# Patient Record
Sex: MALE | Race: WHITE | NOT HISPANIC OR LATINO | Employment: OTHER | URBAN - METROPOLITAN AREA
[De-identification: names, ages, dates, MRNs, and addresses within clinical notes are randomized per-mention and may not be internally consistent; named-entity substitution may affect disease eponyms.]

---

## 2020-02-17 ENCOUNTER — OFFICE VISIT (OUTPATIENT)
Dept: FAMILY MEDICINE | Facility: CLINIC | Age: 50
End: 2020-02-17
Payer: COMMERCIAL

## 2020-02-17 VITALS
BODY MASS INDEX: 33.94 KG/M2 | OXYGEN SATURATION: 96 % | TEMPERATURE: 100 F | DIASTOLIC BLOOD PRESSURE: 98 MMHG | HEART RATE: 111 BPM | HEIGHT: 74 IN | WEIGHT: 264.44 LBS | SYSTOLIC BLOOD PRESSURE: 124 MMHG

## 2020-02-17 DIAGNOSIS — J22 LOWER RESPIRATORY INFECTION: Primary | ICD-10-CM

## 2020-02-17 DIAGNOSIS — R68.89 FLU-LIKE SYMPTOMS: ICD-10-CM

## 2020-02-17 LAB
CTP QC/QA: YES
FLUAV AG NPH QL: NEGATIVE
FLUBV AG NPH QL: NEGATIVE

## 2020-02-17 PROCEDURE — 3008F PR BODY MASS INDEX (BMI) DOCUMENTED: ICD-10-PCS | Mod: CPTII,S$GLB,, | Performed by: FAMILY MEDICINE

## 2020-02-17 PROCEDURE — 87804 INFLUENZA ASSAY W/OPTIC: CPT | Mod: QW,S$GLB,, | Performed by: FAMILY MEDICINE

## 2020-02-17 PROCEDURE — 99203 OFFICE O/P NEW LOW 30 MIN: CPT | Mod: 25,S$GLB,, | Performed by: FAMILY MEDICINE

## 2020-02-17 PROCEDURE — 99203 PR OFFICE/OUTPT VISIT, NEW, LEVL III, 30-44 MIN: ICD-10-PCS | Mod: 25,S$GLB,, | Performed by: FAMILY MEDICINE

## 2020-02-17 PROCEDURE — 3008F BODY MASS INDEX DOCD: CPT | Mod: CPTII,S$GLB,, | Performed by: FAMILY MEDICINE

## 2020-02-17 PROCEDURE — 99999 PR PBB SHADOW E&M-NEW PATIENT-LVL III: ICD-10-PCS | Mod: PBBFAC,,, | Performed by: FAMILY MEDICINE

## 2020-02-17 PROCEDURE — 87804 POCT INFLUENZA A/B: ICD-10-PCS | Mod: QW,S$GLB,, | Performed by: FAMILY MEDICINE

## 2020-02-17 PROCEDURE — 99999 PR PBB SHADOW E&M-NEW PATIENT-LVL III: CPT | Mod: PBBFAC,,, | Performed by: FAMILY MEDICINE

## 2020-02-17 RX ORDER — AZITHROMYCIN 250 MG/1
TABLET, FILM COATED ORAL
Qty: 6 TABLET | Refills: 0 | Status: SHIPPED | OUTPATIENT
Start: 2020-02-17 | End: 2020-02-22

## 2020-02-17 RX ORDER — BENZONATATE 200 MG/1
200 CAPSULE ORAL 3 TIMES DAILY PRN
Qty: 30 CAPSULE | Refills: 2 | Status: SHIPPED | OUTPATIENT
Start: 2020-02-17 | End: 2020-02-27

## 2020-02-17 NOTE — PROGRESS NOTES
THIS DOCUMENT WAS MADE IN PART WITH VOICE RECOGNITION SOFTWARE.  OCCASIONALLY THIS SOFTWARE WILL MISINTERPRET WORDS OR PHRASES.    Assessment and Plan:    1. Flu-like symptoms  Influenza test negative  - POCT Influenza A/B    2. Lower respiratory infection  - azithromycin (Z-LELAND) 250 MG tablet; Take 2 tablets by mouth on day 1; Take 1 tablet by mouth on days 2-5  Dispense: 6 tablet; Refill: 0  - benzonatate (TESSALON) 200 MG capsule; Take 1 capsule (200 mg total) by mouth 3 (three) times daily as needed.  Dispense: 30 capsule; Refill: 2        ______________________________________________________________________  Subjective:    Chief Complaint:  Chief Complaint   Patient presents with    Chest Congestion     ocurring since thursday, cough with brown phelgm production, chills, and body aches         HPI:  Atilio is a 49 y.o. year old     Flu like symptoms  Duration 3-4 days  Cough productive of brown phlegm  Associated with chills and body aches    Past Medical History:  Past Medical History:   Diagnosis Date    Atrial fibrillation     as a result of [place] spotted fever, resolved    [place] spotted fever 2006       Past Surgical History:  Past Surgical History:   Procedure Laterality Date    ATRIAL ABLATION SURGERY      COLONOSCOPY N/A 9/17/2015    Procedure: COLONOSCOPY;  Surgeon: Garcia Suero MD;  Location: [facility];  Service: Endoscopy;  Laterality: N/A;    CYST REMOVAL      arms    TONSILLECTOMY         Family History:  Family History   Problem Relation Age of Onset    Arthritis Mother     Pulmonary embolism Father        Social History:  Social History     Socioeconomic History    Marital status:      Spouse name: Not on file    Number of children: Not on file    Years of education: Not on file    Highest education level: Not on file   Occupational History    Not on file   Social Needs    Financial resource strain: Not on file    Food insecurity:     Worry: Not  "on file     Inability: Not on file    Transportation needs:     Medical: Not on file     Non-medical: Not on file   Tobacco Use    Smoking status: Never Smoker   Substance and Sexual Activity    Alcohol use: Yes     Alcohol/week: 1.0 standard drinks     Types: 1 Cans of beer per week     Comment: 3 glasses wine per month    Drug use: No    Sexual activity: Not on file   Lifestyle    Physical activity:     Days per week: Not on file     Minutes per session: Not on file    Stress: Not on file   Relationships    Social connections:     Talks on phone: Not on file     Gets together: Not on file     Attends Cheondoism service: Not on file     Active member of club or organization: Not on file     Attends meetings of clubs or organizations: Not on file     Relationship status: Not on file   Other Topics Concern    Not on file   Social History Narrative    Not on file       Medications:  Current Outpatient Medications on File Prior to Visit   Medication Sig Dispense Refill    aspirin (ECOTRIN) 81 MG EC tablet Take 81 mg by mouth once daily.      multivitamin capsule Take 1 capsule by mouth once daily.       No current facility-administered medications on file prior to visit.        Allergies:  Hydrocortisone (bulk)    Immunizations:    There is no immunization history on file for this patient.    Review of Systems:  Review of Systems   Constitutional: Positive for chills and fatigue.   Respiratory: Positive for cough.    Musculoskeletal: Positive for myalgias.   All other systems reviewed and are negative.      Objective:    Vitals:  Vitals:    02/17/20 0852   BP: (!) 124/98   Pulse: (!) 111   Temp: 99.6 °F (37.6 °C)   TempSrc: Oral   SpO2: 96%   Weight: 120 kg (264 lb 7.1 oz)   Height: 6' 2" (1.88 m)   PainSc:   6   PainLoc: Generalized       Physical Exam   Constitutional: He appears well-developed.   HENT:   Head: Normocephalic.   Nose: Mucosal edema and rhinorrhea present.   Mouth/Throat: Posterior " oropharyngeal erythema present. No oropharyngeal exudate.   Eyes: EOM are normal.   Neck: Normal range of motion. Neck supple.   Cardiovascular: Normal rate and regular rhythm.   Pulmonary/Chest: Effort normal and breath sounds normal.   Abdominal: Soft.   Skin: Skin is warm. No rash noted.       Data:  No previous labs, imaging, or notes available.        Charanjit Mansfield MD  [facility]

## 2020-12-24 ENCOUNTER — OFFICE VISIT (OUTPATIENT)
Dept: URGENT CARE | Facility: CLINIC | Age: 50
End: 2020-12-24
Payer: COMMERCIAL

## 2020-12-24 ENCOUNTER — TELEPHONE (OUTPATIENT)
Dept: FAMILY MEDICINE | Facility: CLINIC | Age: 50
End: 2020-12-24

## 2020-12-24 VITALS
WEIGHT: 240 LBS | DIASTOLIC BLOOD PRESSURE: 91 MMHG | BODY MASS INDEX: 30.8 KG/M2 | HEIGHT: 74 IN | TEMPERATURE: 98 F | SYSTOLIC BLOOD PRESSURE: 163 MMHG | OXYGEN SATURATION: 99 % | RESPIRATION RATE: 16 BRPM | HEART RATE: 82 BPM

## 2020-12-24 DIAGNOSIS — R31.9 HEMATURIA, UNSPECIFIED TYPE: ICD-10-CM

## 2020-12-24 DIAGNOSIS — R21 RASH: Primary | ICD-10-CM

## 2020-12-24 DIAGNOSIS — R30.0 DYSURIA: ICD-10-CM

## 2020-12-24 LAB
BILIRUB UR QL STRIP: NEGATIVE
GLUCOSE UR QL STRIP: NEGATIVE
KETONES UR QL STRIP: NEGATIVE
LEUKOCYTE ESTERASE UR QL STRIP: NEGATIVE
PH, POC UA: 5 (ref 5–8)
POC BLOOD, URINE: POSITIVE
POC NITRATES, URINE: NEGATIVE
PROT UR QL STRIP: NEGATIVE
SP GR UR STRIP: 1.02 (ref 1–1.03)
UROBILINOGEN UR STRIP-ACNC: NORMAL (ref 0.3–2.2)

## 2020-12-24 PROCEDURE — 81003 POCT URINALYSIS, DIPSTICK, AUTOMATED, W/O SCOPE: ICD-10-PCS | Mod: QW,S$GLB,, | Performed by: PHYSICIAN ASSISTANT

## 2020-12-24 PROCEDURE — 3008F PR BODY MASS INDEX (BMI) DOCUMENTED: ICD-10-PCS | Mod: CPTII,S$GLB,, | Performed by: PHYSICIAN ASSISTANT

## 2020-12-24 PROCEDURE — 99214 PR OFFICE/OUTPT VISIT, EST, LEVL IV, 30-39 MIN: ICD-10-PCS | Mod: 25,S$GLB,, | Performed by: PHYSICIAN ASSISTANT

## 2020-12-24 PROCEDURE — 99214 OFFICE O/P EST MOD 30 MIN: CPT | Mod: 25,S$GLB,, | Performed by: PHYSICIAN ASSISTANT

## 2020-12-24 PROCEDURE — 81003 URINALYSIS AUTO W/O SCOPE: CPT | Mod: QW,S$GLB,, | Performed by: PHYSICIAN ASSISTANT

## 2020-12-24 PROCEDURE — 3008F BODY MASS INDEX DOCD: CPT | Mod: CPTII,S$GLB,, | Performed by: PHYSICIAN ASSISTANT

## 2020-12-24 PROCEDURE — 96372 PR INJECTION,THERAP/PROPH/DIAG2ST, IM OR SUBCUT: ICD-10-PCS | Mod: S$GLB,,, | Performed by: FAMILY MEDICINE

## 2020-12-24 PROCEDURE — 96372 THER/PROPH/DIAG INJ SC/IM: CPT | Mod: S$GLB,,, | Performed by: FAMILY MEDICINE

## 2020-12-24 RX ORDER — BETAMETHASONE SODIUM PHOSPHATE AND BETAMETHASONE ACETATE 3; 3 MG/ML; MG/ML
9 INJECTION, SUSPENSION INTRA-ARTICULAR; INTRALESIONAL; INTRAMUSCULAR; SOFT TISSUE
Status: COMPLETED | OUTPATIENT
Start: 2020-12-24 | End: 2020-12-24

## 2020-12-24 RX ADMIN — BETAMETHASONE SODIUM PHOSPHATE AND BETAMETHASONE ACETATE 9 MG: 3; 3 INJECTION, SUSPENSION INTRA-ARTICULAR; INTRALESIONAL; INTRAMUSCULAR; SOFT TISSUE at 01:12

## 2020-12-24 NOTE — TELEPHONE ENCOUNTER
----- Message from Humble Willis sent at 12/24/2020 10:38 AM CST -----  Type:  Same Day Appointment Request    Caller is requesting a same day appointment.  Caller declined first available appointment listed below.      Name of Caller:  Patient  When is the first available appointment?  12/28  Symptoms:  Rash spreading  Best Call Back Number: [phone]  Additional Information:

## 2020-12-24 NOTE — TELEPHONE ENCOUNTER
Complains of working in yard and may have gotten into some ants or something else.    Asking for antibiotics; advised we do not have any appts today and recommended Urgent Care

## 2020-12-31 LAB
BACTERIA UR CULT: NO GROWTH
BACTERIA UR CULT: NORMAL

## 2021-01-05 ENCOUNTER — TELEPHONE (OUTPATIENT)
Dept: URGENT CARE | Facility: CLINIC | Age: 51
End: 2021-01-05

## 2021-04-29 ENCOUNTER — PATIENT MESSAGE (OUTPATIENT)
Dept: RESEARCH | Facility: HOSPITAL | Age: 51
End: 2021-04-29

## 2021-06-25 ENCOUNTER — IMMUNIZATION (OUTPATIENT)
Dept: FAMILY MEDICINE | Facility: CLINIC | Age: 51
End: 2021-06-25
Payer: COMMERCIAL

## 2021-06-25 DIAGNOSIS — Z23 NEED FOR VACCINATION: Primary | ICD-10-CM

## 2021-06-25 PROCEDURE — 0031A COVID-19,VECTOR-NR,RS-AD26,PF,0.5 ML DOSE VACCINE (JANSSEN): CPT | Mod: PBBFAC | Performed by: FAMILY MEDICINE

## 2021-11-12 ENCOUNTER — OFFICE VISIT (OUTPATIENT)
Dept: FAMILY MEDICINE | Facility: CLINIC | Age: 51
End: 2021-11-12
Payer: COMMERCIAL

## 2021-11-12 VITALS
WEIGHT: 255.5 LBS | TEMPERATURE: 99 F | DIASTOLIC BLOOD PRESSURE: 88 MMHG | HEART RATE: 87 BPM | BODY MASS INDEX: 32.81 KG/M2 | SYSTOLIC BLOOD PRESSURE: 136 MMHG | OXYGEN SATURATION: 98 %

## 2021-11-12 DIAGNOSIS — R05.9 COUGH: Primary | ICD-10-CM

## 2021-11-12 PROCEDURE — 99999 PR PBB SHADOW E&M-EST. PATIENT-LVL III: CPT | Mod: PBBFAC,,, | Performed by: INTERNAL MEDICINE

## 2021-11-12 PROCEDURE — 1160F PR REVIEW ALL MEDS BY PRESCRIBER/CLIN PHARMACIST DOCUMENTED: ICD-10-PCS | Mod: CPTII,S$GLB,, | Performed by: INTERNAL MEDICINE

## 2021-11-12 PROCEDURE — 90686 IIV4 VACC NO PRSV 0.5 ML IM: CPT | Mod: S$GLB,,, | Performed by: INTERNAL MEDICINE

## 2021-11-12 PROCEDURE — 90471 FLU VACCINE (QUAD) GREATER THAN OR EQUAL TO 3YO PRESERVATIVE FREE IM: ICD-10-PCS | Mod: S$GLB,,, | Performed by: INTERNAL MEDICINE

## 2021-11-12 PROCEDURE — 99999 PR PBB SHADOW E&M-EST. PATIENT-LVL III: ICD-10-PCS | Mod: PBBFAC,,, | Performed by: INTERNAL MEDICINE

## 2021-11-12 PROCEDURE — 3079F DIAST BP 80-89 MM HG: CPT | Mod: CPTII,S$GLB,, | Performed by: INTERNAL MEDICINE

## 2021-11-12 PROCEDURE — 90471 IMMUNIZATION ADMIN: CPT | Mod: S$GLB,,, | Performed by: INTERNAL MEDICINE

## 2021-11-12 PROCEDURE — 90686 FLU VACCINE (QUAD) GREATER THAN OR EQUAL TO 3YO PRESERVATIVE FREE IM: ICD-10-PCS | Mod: S$GLB,,, | Performed by: INTERNAL MEDICINE

## 2021-11-12 PROCEDURE — 99213 PR OFFICE/OUTPT VISIT, EST, LEVL III, 20-29 MIN: ICD-10-PCS | Mod: 25,S$GLB,, | Performed by: INTERNAL MEDICINE

## 2021-11-12 PROCEDURE — 3008F BODY MASS INDEX DOCD: CPT | Mod: CPTII,S$GLB,, | Performed by: INTERNAL MEDICINE

## 2021-11-12 PROCEDURE — 3075F SYST BP GE 130 - 139MM HG: CPT | Mod: CPTII,S$GLB,, | Performed by: INTERNAL MEDICINE

## 2021-11-12 PROCEDURE — 3079F PR MOST RECENT DIASTOLIC BLOOD PRESSURE 80-89 MM HG: ICD-10-PCS | Mod: CPTII,S$GLB,, | Performed by: INTERNAL MEDICINE

## 2021-11-12 PROCEDURE — 1159F PR MEDICATION LIST DOCUMENTED IN MEDICAL RECORD: ICD-10-PCS | Mod: CPTII,S$GLB,, | Performed by: INTERNAL MEDICINE

## 2021-11-12 PROCEDURE — 3075F PR MOST RECENT SYSTOLIC BLOOD PRESS GE 130-139MM HG: ICD-10-PCS | Mod: CPTII,S$GLB,, | Performed by: INTERNAL MEDICINE

## 2021-11-12 PROCEDURE — 1160F RVW MEDS BY RX/DR IN RCRD: CPT | Mod: CPTII,S$GLB,, | Performed by: INTERNAL MEDICINE

## 2021-11-12 PROCEDURE — 99213 OFFICE O/P EST LOW 20 MIN: CPT | Mod: 25,S$GLB,, | Performed by: INTERNAL MEDICINE

## 2021-11-12 PROCEDURE — 3008F PR BODY MASS INDEX (BMI) DOCUMENTED: ICD-10-PCS | Mod: CPTII,S$GLB,, | Performed by: INTERNAL MEDICINE

## 2021-11-12 PROCEDURE — 1159F MED LIST DOCD IN RCRD: CPT | Mod: CPTII,S$GLB,, | Performed by: INTERNAL MEDICINE

## 2021-11-15 ENCOUNTER — TELEPHONE (OUTPATIENT)
Dept: FAMILY MEDICINE | Facility: CLINIC | Age: 51
End: 2021-11-15
Payer: COMMERCIAL

## 2021-11-15 DIAGNOSIS — U07.1 COVID-19: Primary | ICD-10-CM

## 2021-11-26 ENCOUNTER — LAB VISIT (OUTPATIENT)
Dept: LAB | Facility: HOSPITAL | Age: 51
End: 2021-11-26
Attending: PHYSICIAN ASSISTANT
Payer: COMMERCIAL

## 2021-11-26 ENCOUNTER — OFFICE VISIT (OUTPATIENT)
Dept: FAMILY MEDICINE | Facility: CLINIC | Age: 51
End: 2021-11-26
Payer: COMMERCIAL

## 2021-11-26 VITALS
OXYGEN SATURATION: 98 % | DIASTOLIC BLOOD PRESSURE: 100 MMHG | BODY MASS INDEX: 33.26 KG/M2 | WEIGHT: 259.06 LBS | SYSTOLIC BLOOD PRESSURE: 160 MMHG | HEART RATE: 94 BPM

## 2021-11-26 DIAGNOSIS — Z86.16 HISTORY OF COVID-19: ICD-10-CM

## 2021-11-26 DIAGNOSIS — R03.0 ELEVATED BP WITHOUT DIAGNOSIS OF HYPERTENSION: ICD-10-CM

## 2021-11-26 DIAGNOSIS — Z86.16 HISTORY OF COVID-19: Primary | ICD-10-CM

## 2021-11-26 LAB
ALBUMIN SERPL BCP-MCNC: 4.1 G/DL (ref 3.5–5.2)
ALP SERPL-CCNC: 79 U/L (ref 55–135)
ALT SERPL W/O P-5'-P-CCNC: 36 U/L (ref 10–44)
ANION GAP SERPL CALC-SCNC: 7 MMOL/L (ref 8–16)
AST SERPL-CCNC: 25 U/L (ref 10–40)
BASOPHILS # BLD AUTO: 0.09 K/UL (ref 0–0.2)
BASOPHILS NFR BLD: 1.4 % (ref 0–1.9)
BILIRUB SERPL-MCNC: 0.4 MG/DL (ref 0.1–1)
BUN SERPL-MCNC: 21 MG/DL (ref 6–20)
CALCIUM SERPL-MCNC: 9.7 MG/DL (ref 8.7–10.5)
CHLORIDE SERPL-SCNC: 101 MMOL/L (ref 95–110)
CO2 SERPL-SCNC: 29 MMOL/L (ref 23–29)
CREAT SERPL-MCNC: 1.1 MG/DL (ref 0.5–1.4)
DIFFERENTIAL METHOD: ABNORMAL
EOSINOPHIL # BLD AUTO: 0.2 K/UL (ref 0–0.5)
EOSINOPHIL NFR BLD: 2.7 % (ref 0–8)
ERYTHROCYTE [DISTWIDTH] IN BLOOD BY AUTOMATED COUNT: 11.9 % (ref 11.5–14.5)
EST. GFR  (AFRICAN AMERICAN): >60 ML/MIN/1.73 M^2
EST. GFR  (NON AFRICAN AMERICAN): >60 ML/MIN/1.73 M^2
GLUCOSE SERPL-MCNC: 108 MG/DL (ref 70–110)
HCT VFR BLD AUTO: 45.8 % (ref 40–54)
HGB BLD-MCNC: 15 G/DL (ref 14–18)
IMM GRANULOCYTES # BLD AUTO: 0.1 K/UL (ref 0–0.04)
IMM GRANULOCYTES NFR BLD AUTO: 1.5 % (ref 0–0.5)
LYMPHOCYTES # BLD AUTO: 1.7 K/UL (ref 1–4.8)
LYMPHOCYTES NFR BLD: 25.7 % (ref 18–48)
MCH RBC QN AUTO: 31.8 PG (ref 27–31)
MCHC RBC AUTO-ENTMCNC: 32.8 G/DL (ref 32–36)
MCV RBC AUTO: 97 FL (ref 82–98)
MONOCYTES # BLD AUTO: 0.7 K/UL (ref 0.3–1)
MONOCYTES NFR BLD: 10.7 % (ref 4–15)
NEUTROPHILS # BLD AUTO: 3.9 K/UL (ref 1.8–7.7)
NEUTROPHILS NFR BLD: 58 % (ref 38–73)
NRBC BLD-RTO: 0 /100 WBC
PLATELET # BLD AUTO: 385 K/UL (ref 150–450)
PMV BLD AUTO: 9.6 FL (ref 9.2–12.9)
POTASSIUM SERPL-SCNC: 5 MMOL/L (ref 3.5–5.1)
PROT SERPL-MCNC: 7.6 G/DL (ref 6–8.4)
RBC # BLD AUTO: 4.72 M/UL (ref 4.6–6.2)
SODIUM SERPL-SCNC: 137 MMOL/L (ref 136–145)
WBC # BLD AUTO: 6.65 K/UL (ref 3.9–12.7)

## 2021-11-26 PROCEDURE — 80053 COMPREHEN METABOLIC PANEL: CPT | Performed by: PHYSICIAN ASSISTANT

## 2021-11-26 PROCEDURE — 99999 PR PBB SHADOW E&M-EST. PATIENT-LVL III: ICD-10-PCS | Mod: PBBFAC,,, | Performed by: PHYSICIAN ASSISTANT

## 2021-11-26 PROCEDURE — 99214 PR OFFICE/OUTPT VISIT, EST, LEVL IV, 30-39 MIN: ICD-10-PCS | Mod: S$GLB,,, | Performed by: PHYSICIAN ASSISTANT

## 2021-11-26 PROCEDURE — 99999 PR PBB SHADOW E&M-EST. PATIENT-LVL III: CPT | Mod: PBBFAC,,, | Performed by: PHYSICIAN ASSISTANT

## 2021-11-26 PROCEDURE — 36415 COLL VENOUS BLD VENIPUNCTURE: CPT | Mod: PO | Performed by: PHYSICIAN ASSISTANT

## 2021-11-26 PROCEDURE — 99214 OFFICE O/P EST MOD 30 MIN: CPT | Mod: S$GLB,,, | Performed by: PHYSICIAN ASSISTANT

## 2021-11-26 PROCEDURE — 85025 COMPLETE CBC W/AUTO DIFF WBC: CPT | Performed by: PHYSICIAN ASSISTANT

## 2021-11-29 ENCOUNTER — TELEPHONE (OUTPATIENT)
Dept: FAMILY MEDICINE | Facility: CLINIC | Age: 51
End: 2021-11-29
Payer: COMMERCIAL

## 2021-11-29 DIAGNOSIS — Z86.16 HISTORY OF COVID-19: Primary | ICD-10-CM

## 2021-11-29 RX ORDER — METHYLPREDNISOLONE 4 MG/1
TABLET ORAL
Qty: 1 EACH | Refills: 0 | Status: SHIPPED | OUTPATIENT
Start: 2021-11-29 | End: 2023-01-03 | Stop reason: ALTCHOICE

## 2022-05-09 ENCOUNTER — PATIENT MESSAGE (OUTPATIENT)
Dept: SMOKING CESSATION | Facility: CLINIC | Age: 52
End: 2022-05-09
Payer: COMMERCIAL

## 2024-08-19 ENCOUNTER — OFFICE VISIT (OUTPATIENT)
Dept: RHEUMATOLOGY | Facility: CLINIC | Age: 54
End: 2024-08-19
Payer: COMMERCIAL

## 2024-08-19 VITALS
DIASTOLIC BLOOD PRESSURE: 78 MMHG | HEART RATE: 86 BPM | WEIGHT: 255.5 LBS | SYSTOLIC BLOOD PRESSURE: 124 MMHG | HEIGHT: 74 IN | BODY MASS INDEX: 32.79 KG/M2

## 2024-08-19 DIAGNOSIS — M13.80 OLIGOARTHRITIS: Primary | ICD-10-CM

## 2024-08-19 DIAGNOSIS — Z71.89 COUNSELING AND COORDINATION OF CARE: ICD-10-CM

## 2024-08-19 PROCEDURE — 1159F MED LIST DOCD IN RCRD: CPT | Mod: CPTII,S$GLB,, | Performed by: STUDENT IN AN ORGANIZED HEALTH CARE EDUCATION/TRAINING PROGRAM

## 2024-08-19 PROCEDURE — 99204 OFFICE O/P NEW MOD 45 MIN: CPT | Mod: S$GLB,,, | Performed by: STUDENT IN AN ORGANIZED HEALTH CARE EDUCATION/TRAINING PROGRAM

## 2024-08-19 PROCEDURE — 3008F BODY MASS INDEX DOCD: CPT | Mod: CPTII,S$GLB,, | Performed by: STUDENT IN AN ORGANIZED HEALTH CARE EDUCATION/TRAINING PROGRAM

## 2024-08-19 PROCEDURE — 3078F DIAST BP <80 MM HG: CPT | Mod: CPTII,S$GLB,, | Performed by: STUDENT IN AN ORGANIZED HEALTH CARE EDUCATION/TRAINING PROGRAM

## 2024-08-19 PROCEDURE — 3074F SYST BP LT 130 MM HG: CPT | Mod: CPTII,S$GLB,, | Performed by: STUDENT IN AN ORGANIZED HEALTH CARE EDUCATION/TRAINING PROGRAM

## 2024-08-19 PROCEDURE — 99999 PR PBB SHADOW E&M-EST. PATIENT-LVL III: CPT | Mod: PBBFAC,,, | Performed by: STUDENT IN AN ORGANIZED HEALTH CARE EDUCATION/TRAINING PROGRAM

## 2024-08-19 RX ORDER — COLCHICINE 0.6 MG/1
TABLET ORAL
Qty: 9 TABLET | Refills: 0 | Status: SHIPPED | OUTPATIENT
Start: 2024-08-19

## 2024-08-19 RX ORDER — ASPIRIN 81 MG/1
TABLET ORAL
COMMUNITY

## 2024-08-19 RX ORDER — MELOXICAM 15 MG/1
15 TABLET ORAL
COMMUNITY
Start: 2024-08-05

## 2024-08-19 NOTE — PROGRESS NOTES
8/19/2024    12:49 PM   Rapid3 Question Responses and Scores   MDHAQ Score 0.1   Psychologic Score 0   Pain Score 4   When you awakened in the morning OVER THE LAST WEEK, did you feel stiff? No   Fatigue Score 0   Global Health Score 0   RAPID3 Score 1.44        Answers submitted by the patient for this visit:  Rheumatology Questionnaire (Submitted on 8/19/2024)  fever: No  eye redness: No  mouth sores: No  headaches: No  shortness of breath: No  chest pain: No  trouble swallowing: No  diarrhea: No  constipation: No  unexpected weight change: No  genital sore: No  During the last 3 days, have you had a skin rash?: No  Bruises or bleeds easily: No  cough: No

## 2024-08-19 NOTE — PATIENT INSTRUCTIONS
Include these anti-Inflammatory foods:  ? Fresh vegetables (all kinds): Aim for a variety of types and colours (a rainbow  of veggies provides phytonutrients) - with a minimum of four to five servings  per day - especially dark, leafy greens (spinach, kale, Asian greens) broccoli  and cauliflower, Brussel sprouts, beetroot  ? Whole pieces of fruit (not juice that strips them of fibre): Three to four  servings per day is a good amount for most people, especially berries and  cherries  ? Fatty fish - such as salmon, trout, sardines, mackerel & tuna  ? Herbs & spices: turmeric, martín, basil, oregano, thyme, cinnamon etc.,  ? Healthy fats: extra virgin olive oil is the best option, avocado oil and the fats  found in seeds, nuts, fish  ? Nuts/seeds - such as walnuts, cashews, almonds, pistachios, pine nuts, demetrius,  hemp  ? Whole grains - brown rice, amaranth, buckwheat, and quinoa  ? Legumes/beans: especially black beans, black-eyed peas, chickpeas, lentils,  red kidney  ? Drinks: water, green tea and organic coffee in moderation  Avoid these inflammatory / processed foods  ? Processed meats - sausages and cold cut meats - ham, salami etc.  ? Refined sugars - found in soft drinks, cookies, cake, lollies, ice cream, some  breakfast cereals  ? Trans fats - found in deep fried foods, fast foods, commercially baked goods  ? Processed snack foods - such as chips and crackers  ? Gluten, white bread & pasta & too many carbohydrates  ? Soybean oil and vegetable oil  ? Alcohol in excessive quantities  www.arthritisnsw.org.au  Easy Food Swaps & Meal Ideas  Breakfast  Instead of white bread (crumpets and English muffins), butter, jam, Nutella, processed  breakfast cereals, pancakes and brown, try:  ? Avocado, nut spread (not peanut), banana/cinnamon on rye bread  ? Have porridge (whole oats) & berries- concha with a small amount of maple  syrup or honey  ? Organic scrambled / boiled eggs with herbs or turmeric  ? Avocado &  smoked salmon on whole grain bread  ? Granola with Greek yoghurt and berries  Lunch  Instead of hot chips, frozen meals, white bread sandwiches (cold cuts),  pasta/potato/Caesar salads try:  ? Vegetable frittata with salad  ? Salads with a variety of fruit and vegetables - also add brown rice, quinoa and  nuts to be more filling - dress with oil and vinegar and spices (not store-bought  dressings); replace lettuce with spinach  ? [place] cakes & salad  ? Lettuce wraps with salmon or trout, herbs and carrot/cucumber  Dinner  Instead of pizza, pasta, hamburgers, red meat roasts try:  ? Try one-tray bakes with fish, lean chicken and vegies (less washing up!)  ? Have minestrone soup instead of pumpkin soup - use a variety of vegies & herbs  - count how many colours and types you can get in one soup  ? Replace side serves of pasta, white rice and potatoes with more veggies, salad  or whole grains  ? Three Way fish and vegies on the BBQ and service with a salad  ? Asian soups with salmon and greens  Snacks  Instead of chips, biscuits, cakes, chocolate, try:  ? A handful of nuts - roast them or buy roasted for some extra crunch and flavour  ? Kale chips - kale, olive oil and a small sprinkle of salt & roast in the oven  ? Toasted kamran spread, carrots, cucumber with hummus or avocado dip  ? Mixed berries and Greek yoghurt  ? Grab a piece of fruit  Drinks  Instead of soft drinks and excessive alcohol, try:  ? Water with a squeeze of citrus or flavoured with cut up fruit  ? Mineral water  ? Green tea and organic coffee in moderation

## 2024-08-19 NOTE — PROGRESS NOTES
RHEUMATOLOGY OUTPATIENT CLINIC NOTE    8/19/2024    Attending Rheumatologist: Rajwinder Vazquez  Primary Care Provider: Charanjit Mansfield MD   Physician Requesting Consultation: Carlota Newell PA-C  [address]  [facility] [place] [address]  Chief Complaint/Reason For Consultation:  Swelling      Subjective:       HPI  Atilio Hernandes is a 54 y.o. White male with no medical history who comes for evaluation of joint pain.     He reports that in 2007 he has spotted [place] fever and since then he has developed episodes of left elbow and wrist pain that usually happens every 2-3 years without particular trigger. He reports that he was evaluated by hand surgery and rheumatologist Dr. Galdamez many years ago and RA was ruled out. He reports he had been treated with intraarticular injection that included steroids and other medications that he is not aware. Most recent flare was in 6/2024. He visited ED and left elbow was aspirated, no crystals and culture was negative. He then established care with another hand surgeon who did left elbow and left wrist CSI (only steroids), which helped and symptoms are resolved.     He denies any other joint pain, skin rashes, swollen glands, CP, SOB.   Eats healthy diet, avoids processed foods.   No smoking, vaping. Very minimal alcohol use. No illicit drugs.     Works as a .     Mother and brother with RA.     Labs   6/2024  CRP 2.6   ESR 53  CMP elevated LFTs, high total protein    Left elbow synovial fluid aspiration: no crystals. Negative culture    2007- had spotted [place] fever.   A couple of years later he developed left elbow and left wrist that would happened every 2 years ago. He reports he used to get left wrist   Now follows with hand surgery about 3 months ago and had left elbow and wrist CSI in 6/2024 which helped somewhat. He used to follow up with Dr. Blake. He reports he was never diagnosed with        Review of Systems   Constitutional:  Negative for fever and unexpected weight change.   HENT:  Negative for mouth sores and trouble swallowing.    Eyes:  Negative for redness.   Respiratory:  Negative for cough and shortness of breath.    Cardiovascular:  Negative for chest pain.   Gastrointestinal:  Negative for constipation and diarrhea.   Genitourinary:  Negative for genital sores.   Integumentary:  Negative for rash.   Neurological:  Negative for headaches.   Hematological:  Does not bruise/bleed easily.        Chronic comorbid conditions affecting medical decision making today:  Past Medical History:   Diagnosis Date    Allergy     Atrial fibrillation     as a result of [place] spotted fever, resolved    [place] spotted fever 01/01/2006     Past Surgical History:   Procedure Laterality Date    ATRIAL ABLATION SURGERY      COLONOSCOPY N/A 9/17/2015    Procedure: COLONOSCOPY;  Surgeon: Garcia Suero MD;  Location: [facility];  Service: Endoscopy;  Laterality: N/A;    CYST REMOVAL      arms    TONSILLECTOMY       Family History   Problem Relation Name Age of Onset    Arthritis Mother      Pulmonary embolism Father       Social History     Substance and Sexual Activity   Alcohol Use Yes    Alcohol/week: 1.0 standard drink of alcohol    Types: 1 Cans of beer per week    Comment: 3 glasses wine per month     Social History     Tobacco Use   Smoking Status Never   Smokeless Tobacco Never     Social History     Substance and Sexual Activity   Drug Use No       Current Outpatient Medications:     aspirin (ECOTRIN) 81 MG EC tablet, Take 1 tablet every day by oral route for 30 days., Disp: , Rfl:     meloxicam (MOBIC) 15 MG tablet, Take 15 mg by mouth. PRN, Disp: , Rfl:     multivit-min/ferrous fumarate (MULTI VITAMIN ORAL), 1 qd, Disp: , Rfl:     NAPROXEN ORAL, PRN, Disp: , Rfl:     benzonatate (TESSALON) 200 MG capsule, Take 200 mg by mouth., Disp: , Rfl:     colchicine (COLCRYS) 0.6 mg tablet,  "1.2 mg at the first sign of flare, followed by 0.6 mg after 1 hour and then 0.6 mg every day for 6 days, Disp: 9 tablet, Rfl: 0    HYDROcodone-acetaminophen (NORCO) 5-325 mg per tablet, Take 1 tablet by mouth every 6 (six) hours as needed for Pain., Disp: 20 tablet, Rfl: 0    XOFLUZA 80 mg tablet, Take 1 tablet by mouth., Disp: , Rfl:     Current Facility-Administered Medications:     EPINEPHrine (EPIPEN) 0.3 mg/0.3 mL pen injection 0.3 mg, 0.3 mg, Intramuscular, PRN, Charanjit Mansfield MD     Objective:         Vitals:    08/19/24 1558   BP: 124/78   Pulse: 86     Physical Exam  Left elbow and left wrist w/o synovitis     Reviewed old and all outside pertinent medical records available.    All lab results personally reviewed and interpreted by me.  Lab Results   Component Value Date    WBC 6.40 06/04/2024    HGB 13.5 (L) 06/04/2024    HCT 40.8 06/04/2024    MCV 92 06/04/2024    MCH 30.3 06/04/2024    MCHC 33.1 06/04/2024    RDW 12.0 06/04/2024     06/04/2024    MPV 8.7 (L) 06/04/2024    PLTEST Normal 05/24/2004       Lab Results   Component Value Date     06/04/2024    K 4.3 06/04/2024     06/04/2024    CO2 26 06/04/2024     (H) 06/04/2024    BUN 23 (H) 06/04/2024    CALCIUM 9.3 06/04/2024    PROT 8.6 (H) 06/04/2024    ALBUMIN 4.6 06/04/2024    BILITOT 0.8 06/04/2024    AST 70 (H) 06/04/2024    ALKPHOS 91 06/04/2024    ALT 63 (H) 06/04/2024       Lab Results   Component Value Date    COLORU YELLOW 05/24/2004    APPEARANCEUA CLOUDY (A) 05/24/2004    SPECGRAV 1.020 05/24/2004    PHUR 5.0 12/24/2020    PROTEINUA NEG 05/24/2004    KETONESU NEG 05/24/2004    LEUKOCYTESUR NEG 05/24/2004    NITRITE NEG 05/24/2004    UROBILINOGEN 0.2 05/24/2004       Lab Results   Component Value Date    CRP 2.60 (H) 06/04/2024       Lab Results   Component Value Date    DEVIKA Negative 06/09/2009    RF Negative 05/13/2008    SEDRATE 53 (H) 06/04/2024       No components found for: "25OHVITDTOT", "64KOYICC1", " ""26FBIJHM1", "METHODNOTE"    Lab Results   Component Value Date    URICACID 4.7 09/02/2011       Lab Results   Component Value Date    HEPBSAG Negative 08/17/2006    HEPCAB Negative 08/17/2006       Imaging:  All imaging reviewed and independently interpreted by me.         ASSESSMENT / PLAN:     Atilio Hernandes is a 54 y.o. White male with:    1. Oligoarthritis  -episodes of joint pain and swelling involving left elbow and wrist only since 2009 after spotty [place] fever. Symptoms have been treated over the years with a combination of steroids injection and other medications that are unknown by his previous hand surgeon. Was evaluated by rheumatologist many years ago and RA was ruled out. Most recent flare in 6/2024 and had left elbow and wrist CSI by new hand surgeon   -Obtain repeat workup for autoimmune causes in November with annual physical labs   -symptoms may be related to reactive arthritis trigger by tick borne infection. Symptoms have been mild over the years. Has not required immunosupression.   -will do a trial of colchicine if flares happens again to see if it helps. Discussed with patient that it may not help but we can try     2. Other specified counseling  - over 10 minutes spent regarding below topics:  - Weight loss counseling done.  - Nutrition and exercise counseling.  - Limitation of alcohol consumption.  - Regular exercise:  Aerobic and resistance.  - Medication counseling provided.    Follow up in about 6 months (around 2/19/2025).    Method of contact with patient concerns: [facility]    Disclaimer:  This note is prepared using voice recognition software and as such is likely to have errors and has not been proof read. Please contact me for questions.     Time spent: 40 minutes in face to face discussion concerning diagnosis, prognosis, review of lab and test results, benefits of treatment as well as management of disease, counseling of patient and coordination of care " between various health care providers.  Greater than half the time spent was used for coordination of care and counseling of patient.    Rajwinder Vazquez M.D.  Rheumatology  [facility]

## 2024-08-29 ENCOUNTER — PATIENT MESSAGE (OUTPATIENT)
Dept: RHEUMATOLOGY | Facility: CLINIC | Age: 54
End: 2024-08-29
Payer: COMMERCIAL